# Patient Record
Sex: FEMALE | Race: BLACK OR AFRICAN AMERICAN | NOT HISPANIC OR LATINO | Employment: FULL TIME | URBAN - METROPOLITAN AREA
[De-identification: names, ages, dates, MRNs, and addresses within clinical notes are randomized per-mention and may not be internally consistent; named-entity substitution may affect disease eponyms.]

---

## 2018-02-14 ENCOUNTER — OFFICE VISIT (OUTPATIENT)
Dept: CARDIOLOGY | Facility: MEDICAL CENTER | Age: 38
End: 2018-02-14
Payer: MEDICAID

## 2018-02-14 VITALS
HEART RATE: 88 BPM | DIASTOLIC BLOOD PRESSURE: 90 MMHG | SYSTOLIC BLOOD PRESSURE: 120 MMHG | OXYGEN SATURATION: 94 % | WEIGHT: 288 LBS | BODY MASS INDEX: 51.03 KG/M2 | HEIGHT: 63 IN

## 2018-02-14 DIAGNOSIS — E66.09 CLASS 1 OBESITY DUE TO EXCESS CALORIES WITHOUT SERIOUS COMORBIDITY IN ADULT, UNSPECIFIED BMI: ICD-10-CM

## 2018-02-14 DIAGNOSIS — R94.31 NONSPECIFIC ABNORMAL ELECTROCARDIOGRAM (ECG) (EKG): ICD-10-CM

## 2018-02-14 PROCEDURE — 99244 OFF/OP CNSLTJ NEW/EST MOD 40: CPT | Performed by: INTERNAL MEDICINE

## 2018-02-14 RX ORDER — ASCORBIC ACID 500 MG
500 TABLET ORAL DAILY
COMMUNITY

## 2018-02-14 ASSESSMENT — ENCOUNTER SYMPTOMS
NAUSEA: 0
FOCAL WEAKNESS: 0
EYES NEGATIVE: 1
NERVOUS/ANXIOUS: 0
MEMORY LOSS: 0
SEIZURES: 0
BACK PAIN: 0
SPEECH CHANGE: 0
COUGH: 0
HEARTBURN: 0
LOSS OF CONSCIOUSNESS: 0
DEPRESSION: 0
PALPITATIONS: 0
SHORTNESS OF BREATH: 0
BRUISES/BLEEDS EASILY: 0
DIZZINESS: 0
WEIGHT LOSS: 0

## 2018-02-14 NOTE — PROGRESS NOTES
Subjective:   Jayla Crisostomo is a 37 y.o. female who presents today   As a new patient  Sent in consultation on Mr. Turner in regards to her recent fall and abnormal EKG    Originally from [place], she have lived here for 5 years. Really likes it  Works nights at [employer]  No limitations, works hard    The other day on her way to work she tripped and fell on 3 cement steps  No antecedent symptoms, no loss of consciousness. Says she was clumsy  Landed on her left side  Still really sore, worse with breathing on and off unpredictable last seconds to maybe once a week  Thinks it's getting better    Data chest x-ray and labs which were normal  Her provider was concerned that her EKG was abnormal and she is here for this reason        History reviewed. No pertinent past medical history.  History reviewed. No pertinent surgical history.  History reviewed. No pertinent family history.  History   Smoking Status   • Never Smoker   Smokeless Tobacco   • Never Used     Allergies   Allergen Reactions   • Penicillins      Vomits/nausea     Outpatient Encounter Prescriptions as of 2/14/2018   Medication Sig Dispense Refill   • Multiple Vitamins-Minerals (MULTI COMPLETE PO) Take  by mouth.     • ascorbic acid (ASCORBIC ACID) 500 MG Tab Take 500 mg by mouth every day.     • Multiple Vitamins-Minerals (ZINC PO) Take  by mouth.       No facility-administered encounter medications on file as of 2/14/2018.      Review of Systems   Constitutional: Negative for malaise/fatigue and weight loss.   Eyes: Negative.    Respiratory: Negative for cough and shortness of breath.    Cardiovascular: Negative for palpitations and leg swelling.   Gastrointestinal: Negative for heartburn and nausea.   Musculoskeletal: Negative for back pain.   Neurological: Negative for dizziness, speech change, focal weakness, seizures and loss of consciousness.   Endo/Heme/Allergies: Does not bruise/bleed easily.   Psychiatric/Behavioral: Negative  "for depression and memory loss. The patient is not nervous/anxious.    All other systems reviewed and are negative.       Objective:   /90   Pulse 88   Ht 1.6 m (5' 3\")   Wt (!) 130.6 kg (288 lb)   SpO2 94%   BMI 51.02 kg/m²     Physical Exam   Constitutional: She is oriented to person, place, and time.   Obese, joking no acute distress   HENT:   Head: Normocephalic and atraumatic.   Neck: Neck supple. No JVD present. No thyromegaly present.   Cardiovascular: Normal rate and regular rhythm.    No murmur heard.  Pulmonary/Chest: Breath sounds normal. She exhibits no tenderness.   Abdominal: Bowel sounds are normal.   Musculoskeletal: She exhibits no edema or tenderness.   Neurological: She is alert and oriented to person, place, and time. She exhibits normal muscle tone. Coordination normal.   Skin: Skin is warm and dry. No rash noted.   Psychiatric: She has a normal mood and affect. Her behavior is normal.       Assessment:     1. Nonspecific abnormal electrocardiogram (ECG) (EKG)  Echocardiogram Comp w/o Cont   2. Class 1 obesity due to excess calories without serious comorbidity in adult, unspecified BMI         Medical Decision Making:  Today's Assessment / Status / Plan:     12 EKG reviewed. Nonspecific finding sinus rhythm    Concern for abnormal EKG  Likely normative  No high risk findings  Echo recommended, discussed    Chest pain  Likely noncardiac  Talked about musculoskeletal pain and as needed NSAIDs  Echo  RTC one month sooner if concerns  If she is still having chest discomfort would strongly recommend CAT scan of the chest to evaluate chest girdle further    Questions answered, RTC as above  "

## 2018-02-14 NOTE — LETTER
[facility]   [address]  [place] [address]  Phone: [phone]  Fax: [phone]              Jayla Crisostomo  1980    Encounter Date: 2/14/2018    Elin Palomo M.D.          PROGRESS NOTE:  Subjective:   Jayla Crisostomo is a 37 y.o. female who presents today   As a new patient  Sent in consultation on Mr. Turner in regards to her recent fall and abnormal EKG    Originally from [place], she have lived here for 5 years. Really likes it  Works nights at [employer]  No limitations, works hard    The other day on her way to work she tripped and fell on 3 cement steps  No antecedent symptoms, no loss of consciousness. Says she was clumsy  Landed on her left side  Still really sore, worse with breathing on and off unpredictable last seconds to maybe once a week  Thinks it's getting better    Data chest x-ray and labs which were normal  Her provider was concerned that her EKG was abnormal and she is here for this reason        History reviewed. No pertinent past medical history.  History reviewed. No pertinent surgical history.  History reviewed. No pertinent family history.  History   Smoking Status   • Never Smoker   Smokeless Tobacco   • Never Used     Allergies   Allergen Reactions   • Penicillins      Vomits/nausea     Outpatient Encounter Prescriptions as of 2/14/2018   Medication Sig Dispense Refill   • Multiple Vitamins-Minerals (MULTI COMPLETE PO) Take  by mouth.     • ascorbic acid (ASCORBIC ACID) 500 MG Tab Take 500 mg by mouth every day.     • Multiple Vitamins-Minerals (ZINC PO) Take  by mouth.       No facility-administered encounter medications on file as of 2/14/2018.      Review of Systems   Constitutional: Negative for malaise/fatigue and weight loss.   Eyes: Negative.    Respiratory: Negative for cough and shortness of breath.    Cardiovascular: Negative for palpitations and leg swelling.   Gastrointestinal:  "Negative for heartburn and nausea.   Musculoskeletal: Negative for back pain.   Neurological: Negative for dizziness, speech change, focal weakness, seizures and loss of consciousness.   Endo/Heme/Allergies: Does not bruise/bleed easily.   Psychiatric/Behavioral: Negative for depression and memory loss. The patient is not nervous/anxious.    All other systems reviewed and are negative.       Objective:   /90   Pulse 88   Ht 1.6 m (5' 3\")   Wt (!) 130.6 kg (288 lb)   SpO2 94%   BMI 51.02 kg/m²      Physical Exam   Constitutional: She is oriented to person, place, and time.   Obese, joking no acute distress   HENT:   Head: Normocephalic and atraumatic.   Neck: Neck supple. No JVD present. No thyromegaly present.   Cardiovascular: Normal rate and regular rhythm.    No murmur heard.  Pulmonary/Chest: Breath sounds normal. She exhibits no tenderness.   Abdominal: Bowel sounds are normal.   Musculoskeletal: She exhibits no edema or tenderness.   Neurological: She is alert and oriented to person, place, and time. She exhibits normal muscle tone. Coordination normal.   Skin: Skin is warm and dry. No rash noted.   Psychiatric: She has a normal mood and affect. Her behavior is normal.       Assessment:     1. Nonspecific abnormal electrocardiogram (ECG) (EKG)  Echocardiogram Comp w/o Cont   2. Class 1 obesity due to excess calories without serious comorbidity in adult, unspecified BMI         Medical Decision Making:  Today's Assessment / Status / Plan:     12 EKG reviewed. Nonspecific finding sinus rhythm    Concern for abnormal EKG  Likely normative  No high risk findings  Echo recommended, discussed    Chest pain  Likely noncardiac  Talked about musculoskeletal pain and as needed NSAIDs  Echo  RTC one month sooner if concerns  If she is still having chest discomfort would strongly recommend CAT scan of the chest to evaluate chest girdle further    Questions answered, RTC as above      Kayley Turner, " LIVIER.P.R.N.  [address]  [address]  [place] [place] [address]  VIA Facsimile: [phone]

## 2018-03-05 ENCOUNTER — HOSPITAL ENCOUNTER (OUTPATIENT)
Dept: CARDIOLOGY | Facility: MEDICAL CENTER | Age: 38
End: 2018-03-05
Attending: INTERNAL MEDICINE
Payer: MEDICAID

## 2018-03-05 DIAGNOSIS — R94.31 NONSPECIFIC ABNORMAL ELECTROCARDIOGRAM (ECG) (EKG): ICD-10-CM

## 2018-03-05 PROCEDURE — 93306 TTE W/DOPPLER COMPLETE: CPT | Mod: 26 | Performed by: INTERNAL MEDICINE

## 2018-03-05 PROCEDURE — 93306 TTE W/DOPPLER COMPLETE: CPT

## 2018-03-06 ENCOUNTER — TELEPHONE (OUTPATIENT)
Dept: CARDIOLOGY | Facility: MEDICAL CENTER | Age: 38
End: 2018-03-06

## 2018-03-06 LAB
LV EJECT FRACT  99904: 60
LV EJECT FRACT MOD 4C 99902: 67.16

## 2018-03-06 NOTE — TELEPHONE ENCOUNTER
----- Message -----  From: Elin Palomo M.D.  Sent: 3/6/2018   7:51 AM  To: Marlene Acuña R.N.    Normal & reassuring echo    ====================================================    Attempted to call pt, no answer, left vm to call back     Result letter mailed to pt

## 2018-03-06 NOTE — LETTER
March 6, 2018        Jayla Crisostomo  [address] [address]        Dear Jayla:    Dr Elin Palomo reviewed your recent Echocardiogram and she said:    Normal & reassuring echocardiogram.       If you have any questions or concerns, please don't hesitate to call our office, [phone].     Thank You.        Sincerely,    Carrie HERNANDEZ/Dr Elin Palomo

## 2018-03-07 ENCOUNTER — TELEPHONE (OUTPATIENT)
Dept: CARDIOLOGY | Facility: MEDICAL CENTER | Age: 38
End: 2018-03-07

## 2018-03-07 NOTE — TELEPHONE ENCOUNTER
[facility], Personal Care Attendant, form filed on Dr Palomo folder.

## 2018-03-13 NOTE — TELEPHONE ENCOUNTER
Per Mattie HERNANDEZ (covering RN) that form is already completed and faxed back to [facility].

## 2018-03-15 NOTE — TELEPHONE ENCOUNTER
RIN Zambrano/Carrie Soni @ [facility] called and said they did not receive the clearance form. She can be reached at [phone].      =======================================================    Clearance re-faxed to [facility], F#[phone].     Attempted to call pt, no answer, detailed vm left that will re-faxed clearance    Copy of clearance to scanning

## 2018-03-19 NOTE — TELEPHONE ENCOUNTER
Nae from [facility] called and reports that they still haven't received the clearance yet, she requested for it to be faxed in different number, F#[phone].     Copy of clearance was forwarded to scanning last Friday does not seem like it is scanned under media yet, will re-faxed the clearance to [facility] once the copy is scanned

## 2020-02-28 ENCOUNTER — NON-PROVIDER VISIT (OUTPATIENT)
Dept: OCCUPATIONAL MEDICINE | Facility: CLINIC | Age: 40
End: 2020-02-28

## 2020-02-28 DIAGNOSIS — Z02.1 DRUG TESTING, PRE-EMPLOYMENT: ICD-10-CM

## 2020-02-28 PROCEDURE — 80305 DRUG TEST PRSMV DIR OPT OBS: CPT | Performed by: PREVENTIVE MEDICINE

## 2020-03-03 ENCOUNTER — OFFICE VISIT (OUTPATIENT)
Dept: OCCUPATIONAL MEDICINE | Facility: CLINIC | Age: 40
End: 2020-03-03

## 2020-03-03 VITALS
WEIGHT: 255 LBS | DIASTOLIC BLOOD PRESSURE: 74 MMHG | OXYGEN SATURATION: 96 % | BODY MASS INDEX: 45.18 KG/M2 | HEIGHT: 63 IN | RESPIRATION RATE: 14 BRPM | SYSTOLIC BLOOD PRESSURE: 116 MMHG | TEMPERATURE: 98.1 F | HEART RATE: 76 BPM

## 2020-03-03 DIAGNOSIS — Z02.4 ENCOUNTER FOR COMMERCIAL DRIVER MEDICAL EXAMINATION (CDME): ICD-10-CM

## 2020-03-03 PROCEDURE — 7100 PR DOT PHYSICAL: Performed by: PREVENTIVE MEDICINE

## 2020-03-03 SDOH — HEALTH STABILITY: MENTAL HEALTH: HOW MANY STANDARD DRINKS CONTAINING ALCOHOL DO YOU HAVE ON A TYPICAL DAY?: 1 OR 2

## 2020-03-03 SDOH — HEALTH STABILITY: MENTAL HEALTH: HOW OFTEN DO YOU HAVE A DRINK CONTAINING ALCOHOL?: 2-4 TIMES A MONTH

## 2020-03-03 SDOH — HEALTH STABILITY: MENTAL HEALTH: HOW OFTEN DO YOU HAVE 6 OR MORE DRINKS ON ONE OCCASION?: NEVER
